# Patient Record
Sex: MALE | Race: WHITE
[De-identification: names, ages, dates, MRNs, and addresses within clinical notes are randomized per-mention and may not be internally consistent; named-entity substitution may affect disease eponyms.]

---

## 2020-05-08 ENCOUNTER — HOSPITAL ENCOUNTER (EMERGENCY)
Dept: HOSPITAL 4 - SED | Age: 20
Discharge: HOME | End: 2020-05-08
Payer: MEDICAID

## 2020-05-08 VITALS — HEIGHT: 72 IN | BODY MASS INDEX: 35.21 KG/M2 | WEIGHT: 260 LBS

## 2020-05-08 VITALS — SYSTOLIC BLOOD PRESSURE: 141 MMHG

## 2020-05-08 VITALS — SYSTOLIC BLOOD PRESSURE: 133 MMHG

## 2020-05-08 DIAGNOSIS — F14.90: ICD-10-CM

## 2020-05-08 DIAGNOSIS — Z72.89: ICD-10-CM

## 2020-05-08 DIAGNOSIS — R00.2: ICD-10-CM

## 2020-05-08 DIAGNOSIS — F41.9: Primary | ICD-10-CM

## 2020-05-08 DIAGNOSIS — I10: ICD-10-CM

## 2020-05-08 NOTE — NUR
Patient is alert and oriented x4 and states he was asleep then woke up and was 
sitting down on couch when he started feeling a "weird feeling" and heart 
started beating fast and felt like he could barely breath. Patient states he 
will randomly feel uneasy and forgetful. Patient reports he started feeling 
this way about 20 mins prior coming to the ER. Patient denies drinking alcohol 
or doing any drugs in the past 24 hours. Patient denies any other medical 
complaints at this time. Will continue to monitor.

## 2020-05-08 NOTE — NUR
-------------------------------------------------------------------------------

            *** Note ellen in EDM - 05/08/20 at 0425 by ELIEL ***            

-------------------------------------------------------------------------------

Patient is alert and oriented x4 and states he was asleep then woke up and was 
sitting down on couch when he started feeling a "weird feeling" and heart 
started beating fast and felt like he could barely breath. Patient states he 
will randomly feel uneasy and forgetful. Patient reports he started feeling 
this way about 20 mins prior coming to the ER. Patient denies any other medical 
complaints at this time. Will continue to monitor.

## 2020-05-08 NOTE — NUR
[facility], believes he drank about a 
12 pack of beer. Patient states he took a xanax and did 'nos' as well.  Patient 
reports his last drink was at 10pm on 5/5/2020 because he fell asleep. Patient 
states he has not had anything to drink or done drugs since then. MD notified.

## 2020-05-08 NOTE — NUR
Patient given written and verbal discharge instructions and verbalizes 
understanding.  ER MD discussed with patient the results and treatment 
provided. Patient in stable condition. ID arm band removed. 

No Rx given. Patient educated on pain management and to follow up with PMD. 
Pain Scale 0/10.

Opportunity for questions provided and answered. Medication side effect fact 
sheet provided.

## 2021-07-30 NOTE — NUR
PT AAO AND AMBULATING REPORTING WORSENING SOB, COUGH, AND LOSS OF TASTE AND 
SMELL X 4 DAYS. PT IS UNVACCINATED AND REQUESTS COVID TEST. V/S STABLE 
CURRENTLY AND HAS NO S/S OF DISTRESS.

## 2021-07-30 NOTE — NUR
Patient given written and verbal discharge instructions and verbalizes 
understanding.  DR. MARIA EUGENIA GUERRERO MD discussed with patient the results and treatment 
provided. Patient in stable condition. ID arm band removed. 

Rx PER DR. DEL CID. Patient educated on pain management and to follow up with PMD. 
Pain Scale 0/10.

Opportunity for questions provided and answered. Medication side effect fact 
sheet provided.